# Patient Record
Sex: MALE | Race: WHITE | Employment: FULL TIME | URBAN - METROPOLITAN AREA
[De-identification: names, ages, dates, MRNs, and addresses within clinical notes are randomized per-mention and may not be internally consistent; named-entity substitution may affect disease eponyms.]

---

## 2024-04-18 ENCOUNTER — OFFICE VISIT (OUTPATIENT)
Dept: VASCULAR SURGERY | Age: 44
End: 2024-04-18
Payer: COMMERCIAL

## 2024-04-18 VITALS
RESPIRATION RATE: 17 BRPM | WEIGHT: 150 LBS | HEART RATE: 59 BPM | DIASTOLIC BLOOD PRESSURE: 85 MMHG | BODY MASS INDEX: 20.32 KG/M2 | SYSTOLIC BLOOD PRESSURE: 145 MMHG | HEIGHT: 72 IN

## 2024-04-18 DIAGNOSIS — I83.891 SYMPTOMATIC VARICOSE VEINS OF RIGHT LOWER EXTREMITY: Primary | ICD-10-CM

## 2024-04-18 PROCEDURE — 99203 OFFICE O/P NEW LOW 30 MIN: CPT | Performed by: SURGERY

## 2024-04-18 ASSESSMENT — ENCOUNTER SYMPTOMS
GASTROINTESTINAL NEGATIVE: 1
SINUS PAIN: 1
COUGH: 1
SINUS PRESSURE: 1
ALLERGIC/IMMUNOLOGIC NEGATIVE: 1
BACK PAIN: 1
EYES NEGATIVE: 1

## 2024-04-18 NOTE — PROGRESS NOTES
Kaushal Waters   44 y.o.  male referred from [facility] for evaluation of a 3-week episode in the fairly recent past of a dilated itchy and inflamed vein running from the patient's medial upper right thigh laterally.  The symptoms and findings slowly resolved and currently he has not noticed any problems.  He reports that the changes seem to be most pronounced when he was on his feet during that timeframe.  No previous history of reported of varicosities.  No history of SVT, bleeding, ulceration, dermatitis or edema.  He has noted significant spider veins bilaterally.  No previous venous interventions and no stocking use.    No past medical history on file.  No past surgical history on file.  No Known Allergies  No current outpatient medications on file.     No current facility-administered medications for this visit.     Social History     Socioeconomic History    Marital status:      Spouse name: Not on file    Number of children: Not on file    Years of education: Not on file    Highest education level: Not on file   Occupational History    Not on file   Tobacco Use    Smoking status: Every Day     Current packs/day: 1.00     Average packs/day: 1 pack/day for 20.0 years (20.0 ttl pk-yrs)     Types: Cigarettes    Smokeless tobacco: Never   Substance and Sexual Activity    Alcohol use: Not Currently    Drug use: Never    Sexual activity: Not on file   Other Topics Concern    Not on file   Social History Narrative    Not on file     Social Determinants of Health     Financial Resource Strain: Not on file   Food Insecurity: Not on file   Transportation Needs: Not on file   Physical Activity: Not on file   Stress: Not on file   Social Connections: Not on file   Intimate Partner Violence: Not on file   Housing Stability: Not on file     No family history on file.          Review of Systems   Constitutional: Negative.    HENT:  Positive for congestion, sinus pressure and sinus pain.    Eyes: Negative.

## 2024-05-08 ENCOUNTER — OFFICE VISIT (OUTPATIENT)
Age: 44
End: 2024-05-08

## 2024-05-08 VITALS
TEMPERATURE: 98.3 F | OXYGEN SATURATION: 95 % | SYSTOLIC BLOOD PRESSURE: 128 MMHG | DIASTOLIC BLOOD PRESSURE: 74 MMHG | WEIGHT: 148 LBS | BODY MASS INDEX: 20.07 KG/M2 | HEART RATE: 65 BPM

## 2024-05-08 DIAGNOSIS — L03.90 CELLULITIS, UNSPECIFIED CELLULITIS SITE: Primary | ICD-10-CM

## 2024-05-08 RX ORDER — CEPHALEXIN 500 MG/1
500 CAPSULE ORAL 4 TIMES DAILY
Qty: 40 CAPSULE | Refills: 0 | Status: SHIPPED | OUTPATIENT
Start: 2024-05-08 | End: 2024-05-18

## 2024-05-08 ASSESSMENT — ENCOUNTER SYMPTOMS
COUGH: 0
DIARRHEA: 0
RHINORRHEA: 0
SHORTNESS OF BREATH: 0
VOMITING: 0
SORE THROAT: 0
EYE PAIN: 0

## 2024-05-16 DIAGNOSIS — I83.891 SYMPTOMATIC VARICOSE VEINS OF RIGHT LOWER EXTREMITY: Primary | ICD-10-CM

## 2024-07-18 ENCOUNTER — OFFICE VISIT (OUTPATIENT)
Dept: VASCULAR SURGERY | Age: 44
End: 2024-07-18
Payer: COMMERCIAL

## 2024-07-18 VITALS
DIASTOLIC BLOOD PRESSURE: 80 MMHG | RESPIRATION RATE: 17 BRPM | SYSTOLIC BLOOD PRESSURE: 150 MMHG | HEIGHT: 72 IN | HEART RATE: 51 BPM | BODY MASS INDEX: 20.32 KG/M2 | WEIGHT: 150 LBS

## 2024-07-18 DIAGNOSIS — I83.899 SYMPTOMATIC RETICULAR VEINS: Primary | ICD-10-CM

## 2024-07-18 PROCEDURE — 99213 OFFICE O/P EST LOW 20 MIN: CPT | Performed by: SURGERY

## 2024-07-18 NOTE — PROGRESS NOTES
Seen back for area of intermittent burning pain R anterior and lateral thigh - seems to have become less frequent and less intense. No sox.  No reported edema, bleeding, tender cord, skin changes or ulceration.  Recent venous reflux scan performed on 7/18/2024 at -.    EXAM:  No edema, ulceration or dermatitis.    No large VVs.     Cluster of spider veins on anterior R thigh with reticular vein traveling away from cluster - no inflammation     VRS - R CF and PV reflux; trace R SFJ reflux (0.6 sec), mid/distal R LSV reflux (small vein < 2 mm)    A/P: 1) Spider & reticular veins R thigh   2) trace junctional and LSV reflux - no causative   Would not recommend any surgical intervention but could consider sclerotherapy (1-2 tx) separate from insurance.  This was discussed in terms that the patient could understand.   The risks were discussed.   I answered all questions pertaining to management options & expectations.   Time spent counseling and coordination of care: 25 minutes.  More than 50% of visit was spent reviewing and discussing venous duplex scan.  Patient is not greatly concerned about this from a medical, symptomatic or appearance standpoint and we will observe this.  He will return to see us should he decide to proceed with any sclerotherapy separate from insurance coverage.